# Patient Record
Sex: MALE | Race: WHITE | NOT HISPANIC OR LATINO | Employment: FULL TIME | URBAN - METROPOLITAN AREA
[De-identification: names, ages, dates, MRNs, and addresses within clinical notes are randomized per-mention and may not be internally consistent; named-entity substitution may affect disease eponyms.]

---

## 2017-08-30 ENCOUNTER — GENERIC CONVERSION - ENCOUNTER (OUTPATIENT)
Dept: OTHER | Facility: OTHER | Age: 62
End: 2017-08-30

## 2017-08-30 DIAGNOSIS — N40.1 ENLARGED PROSTATE WITH LOWER URINARY TRACT SYMPTOMS (LUTS): ICD-10-CM

## 2018-01-22 VITALS
WEIGHT: 242 LBS | HEIGHT: 68 IN | SYSTOLIC BLOOD PRESSURE: 142 MMHG | BODY MASS INDEX: 36.68 KG/M2 | DIASTOLIC BLOOD PRESSURE: 84 MMHG

## 2018-08-28 RX ORDER — CHLORAL HYDRATE 500 MG
1 CAPSULE ORAL DAILY
COMMUNITY

## 2018-08-28 RX ORDER — LISINOPRIL 10 MG/1
20 TABLET ORAL
COMMUNITY
End: 2020-07-15 | Stop reason: ALTCHOICE

## 2018-08-28 RX ORDER — ERGOCALCIFEROL (VITAMIN D2) 10 MCG
1 TABLET ORAL DAILY
COMMUNITY
End: 2020-07-28

## 2018-08-28 RX ORDER — HYDROCORTISONE ACETATE 25 MG/1
25 SUPPOSITORY RECTAL 2 TIMES DAILY
Status: ON HOLD | COMMUNITY
Start: 2013-12-10 | End: 2020-08-04 | Stop reason: ALTCHOICE

## 2018-08-28 RX ORDER — CLOTRIMAZOLE 1 %
CREAM (GRAM) TOPICAL
COMMUNITY
Start: 2017-07-11 | End: 2020-07-15 | Stop reason: ALTCHOICE

## 2018-08-30 ENCOUNTER — OFFICE VISIT (OUTPATIENT)
Dept: UROLOGY | Facility: MEDICAL CENTER | Age: 63
End: 2018-08-30
Payer: COMMERCIAL

## 2018-08-30 VITALS
HEIGHT: 68 IN | SYSTOLIC BLOOD PRESSURE: 136 MMHG | DIASTOLIC BLOOD PRESSURE: 88 MMHG | BODY MASS INDEX: 34.86 KG/M2 | WEIGHT: 230 LBS

## 2018-08-30 DIAGNOSIS — N40.1 BPH WITH OBSTRUCTION/LOWER URINARY TRACT SYMPTOMS: Primary | ICD-10-CM

## 2018-08-30 DIAGNOSIS — N43.40 SPERMATOCELE: ICD-10-CM

## 2018-08-30 DIAGNOSIS — N13.8 BPH WITH OBSTRUCTION/LOWER URINARY TRACT SYMPTOMS: Primary | ICD-10-CM

## 2018-08-30 LAB
SL AMB  POCT GLUCOSE, UA: NORMAL
SL AMB LEUKOCYTE ESTERASE,UA: NORMAL
SL AMB POCT BILIRUBIN,UA: NORMAL
SL AMB POCT BLOOD,UA: NORMAL
SL AMB POCT CLARITY,UA: CLEAR
SL AMB POCT COLOR,UA: YELLOW
SL AMB POCT KETONES,UA: NORMAL
SL AMB POCT NITRITE,UA: NORMAL
SL AMB POCT PH,UA: 6
SL AMB POCT SPECIFIC GRAVITY,UA: 1.01
SL AMB POCT URINE PROTEIN: NORMAL
SL AMB POCT UROBILINOGEN: 0.2

## 2018-08-30 PROCEDURE — 81003 URINALYSIS AUTO W/O SCOPE: CPT | Performed by: UROLOGY

## 2018-08-30 PROCEDURE — 99214 OFFICE O/P EST MOD 30 MIN: CPT | Performed by: UROLOGY

## 2018-08-30 NOTE — PROGRESS NOTES
Assessment/Plan:  1  BPH with slight increase of symptoms  Options discussed, it is certainly is call whether he is symptomatic enough to take a medicine such as Flomax  2   Observed spermatocele    3  Follow-up one year with PSA  No problem-specific Assessment & Plan notes found for this encounter  Diagnoses and all orders for this visit:    BPH with obstruction/lower urinary tract symptoms  -     POCT urine dip auto non-scope  -     PSA Total, Diagnostic; Future    Spermatocele    Other orders  -     aspirin 81 MG tablet; Take by mouth  -     clotrimazole (LOTRIMIN) 1 % cream; Apply bid as directed  -     Multiple Vitamin (DAILY VALUE MULTIVITAMIN) TABS; Take by mouth  -     lisinopril (ZESTRIL) 10 mg tablet; Take by mouth  -     Omega-3 1000 MG CAPS; Take by mouth  -     hydrocortisone (ANUSOL-HC) 25 mg suppository; Insert 25 mg into the rectum 2 (two) times a day          Subjective:      Patient ID: Tapan Mayorga is a 60 y o  male  1   Mild BPH,  flow is a bit slower than last year, but overall tolerates it okay  Rare nocturia, no hematuria infections stones incontinence  2   Right spermatocele is occasionally uncomfortable when sitting prolonged times, but does not really feel at riding his motorcycle  The following portions of the patient's history were reviewed and updated as appropriate: allergies, current medications, past family history, past medical history, past social history, past surgical history and problem list     Review of Systems   All other systems reviewed and are negative  Objective:      /88   Ht 5' 8" (1 727 m)   Wt 104 kg (230 lb)   BMI 34 97 kg/m²          Physical Exam   Constitutional: He is oriented to person, place, and time  He appears well-developed and well-nourished  No distress  HENT:   Head: Normocephalic and atraumatic  Eyes: Conjunctivae are normal    Cardiovascular: Normal rate and regular rhythm      Pulmonary/Chest: Effort normal and breath sounds normal  No respiratory distress  He has no wheezes  Abdominal: Soft  Bowel sounds are normal  He exhibits no distension and no mass  There is no tenderness  Genitourinary:   Genitourinary Comments: 4 cm right spermatocele, nontender  Testes normal  Prostate minimally enlarged no nodules   Neurological: He is alert and oriented to person, place, and time  Skin: Skin is warm and dry  He is not diaphoretic

## 2018-08-30 NOTE — LETTER
August 30, 2018     Moy De Jesus MD  [address]    Patient: Swathi Hein   YOB: 1955   Date of Visit: 8/30/2018       Dear Dr Bryanna Stanley: Thank you for referring Gemini Myles to me for evaluation  Below are my notes for this consultation  If you have questions, please do not hesitate to call me  I look forward to following your patient along with you  Sincerely,        Josh Arroyo MD        CC: No Recipients  Josh Arroyo MD  8/30/2018  4:37 PM  Sign at close encounter  Assessment/Plan:  1  BPH with slight increase of symptoms  Options discussed, it is certainly is call whether he is symptomatic enough to take a medicine such as Flomax  2   Observed spermatocele    3  Follow-up one year with PSA  No problem-specific Assessment & Plan notes found for this encounter  Diagnoses and all orders for this visit:    BPH with obstruction/lower urinary tract symptoms  -     POCT urine dip auto non-scope  -     PSA Total, Diagnostic; Future    Spermatocele    Other orders  -     aspirin 81 MG tablet; Take by mouth  -     clotrimazole (LOTRIMIN) 1 % cream; Apply bid as directed  -     Multiple Vitamin (DAILY VALUE MULTIVITAMIN) TABS; Take by mouth  -     lisinopril (ZESTRIL) 10 mg tablet; Take by mouth  -     Omega-3 1000 MG CAPS; Take by mouth  -     hydrocortisone (ANUSOL-HC) 25 mg suppository; Insert 25 mg into the rectum 2 (two) times a day          Subjective:      Patient ID: Swatih Hein is a 60 y o  male  1   Mild BPH,  flow is a bit slower than last year, but overall tolerates it okay  Rare nocturia, no hematuria infections stones incontinence  2   Right spermatocele is occasionally uncomfortable when sitting prolonged times, but does not really feel at riding his motorcycle          The following portions of the patient's history were reviewed and updated as appropriate: allergies, current medications, past family history, past medical history, past social history, past surgical history and problem list     Review of Systems   All other systems reviewed and are negative  Objective:      /88   Ht 5' 8" (1 727 m)   Wt 104 kg (230 lb)   BMI 34 97 kg/m²           Physical Exam   Constitutional: He is oriented to person, place, and time  He appears well-developed and well-nourished  No distress  HENT:   Head: Normocephalic and atraumatic  Eyes: Conjunctivae are normal    Cardiovascular: Normal rate and regular rhythm  Pulmonary/Chest: Effort normal and breath sounds normal  No respiratory distress  He has no wheezes  Abdominal: Soft  Bowel sounds are normal  He exhibits no distension and no mass  There is no tenderness  Genitourinary:   Genitourinary Comments: 4 cm right spermatocele, nontender  Testes normal  Prostate minimally enlarged no nodules   Neurological: He is alert and oriented to person, place, and time  Skin: Skin is warm and dry  He is not diaphoretic

## 2019-09-09 ENCOUNTER — OFFICE VISIT (OUTPATIENT)
Dept: UROLOGY | Facility: MEDICAL CENTER | Age: 64
End: 2019-09-09
Payer: COMMERCIAL

## 2019-09-09 VITALS
HEART RATE: 69 BPM | BODY MASS INDEX: 38.49 KG/M2 | WEIGHT: 254 LBS | HEIGHT: 68 IN | DIASTOLIC BLOOD PRESSURE: 82 MMHG | SYSTOLIC BLOOD PRESSURE: 130 MMHG

## 2019-09-09 DIAGNOSIS — N40.1 BPH WITH URINARY OBSTRUCTION: Primary | ICD-10-CM

## 2019-09-09 DIAGNOSIS — N13.8 BPH WITH URINARY OBSTRUCTION: Primary | ICD-10-CM

## 2019-09-09 DIAGNOSIS — N43.40 SPERMATOCELE: ICD-10-CM

## 2019-09-09 PROCEDURE — 99214 OFFICE O/P EST MOD 30 MIN: CPT | Performed by: UROLOGY

## 2019-09-09 NOTE — PROGRESS NOTES
HISTORY:    1  Follow-up BPH, no more bothered than last year, mild symptoms of urgency slower flow, but empties okay no hematuria infections stones  2  Right spermatocele measured 4 cm on sono three years ago, no discomfort    3  Recent PSA low at 0 3       ASSESSMENT / PLAN:    Symptom wise, doing well without any need for BPH meds  He will monitor any change in symptoms and let me know if needs to consider starting meds    Similarly, no need for treatment of the spermatocele unless were to bother him    Follow-up here if any new issues come up, yearly PSA at family    The following portions of the patient's history were reviewed and updated as appropriate: allergies, current medications, past family history, past medical history, past social history, past surgical history and problem list     Review of Systems   All other systems reviewed and are negative  Objective:     Physical Exam   Constitutional: He is oriented to person, place, and time  He appears well-developed and well-nourished  No distress  HENT:   Head: Normocephalic and atraumatic  Eyes: Conjunctivae are normal    Cardiovascular: Normal rate and regular rhythm  Pulmonary/Chest: Effort normal and breath sounds normal  No respiratory distress  He has no wheezes  Abdominal: Soft  Bowel sounds are normal  He exhibits no distension and no mass  There is no tenderness  Genitourinary:   Genitourinary Comments: Penis testes normal except for soft, nontender spermatocele over right testicle  Prostate minimally enlarged no nodules   Neurological: He is alert and oriented to person, place, and time  Skin: Skin is warm and dry  He is not diaphoretic           No results found for: PSA]  No results found for: BUN  No results found for: CREATININE  No components found for: CBC      Patient Active Problem List   Diagnosis    BPH with urinary obstruction    Spermatocele        Diagnoses and all orders for this visit:    BPH with urinary obstruction    Spermatocele           Patient ID: Tanisha Beatty is a 58 y o  male        Current Outpatient Medications:     aspirin 81 MG tablet, Take by mouth, Disp: , Rfl:     clotrimazole (LOTRIMIN) 1 % cream, Apply bid as directed, Disp: , Rfl:     lisinopril (ZESTRIL) 10 mg tablet, Take by mouth, Disp: , Rfl:     Multiple Vitamin (DAILY VALUE MULTIVITAMIN) TABS, Take by mouth, Disp: , Rfl:     Omega-3 1000 MG CAPS, Take by mouth, Disp: , Rfl:     hydrocortisone (ANUSOL-HC) 25 mg suppository, Insert 25 mg into the rectum 2 (two) times a day, Disp: , Rfl:     Past Medical History:   Diagnosis Date    Benign prostatic hyperplasia with lower urinary tract symptoms     Epididymitis     Hyperlipidemia     Hypertension     Spermatocele        Past Surgical History:   Procedure Laterality Date    HERNIA REPAIR  1997    KNEE SURGERY Right 04/2016    For removal of bursa       Social History

## 2020-06-04 ENCOUNTER — TELEPHONE (OUTPATIENT)
Dept: UROLOGY | Facility: MEDICAL CENTER | Age: 65
End: 2020-06-04

## 2020-06-04 ENCOUNTER — OFFICE VISIT (OUTPATIENT)
Dept: UROLOGY | Facility: MEDICAL CENTER | Age: 65
End: 2020-06-04
Payer: COMMERCIAL

## 2020-06-04 VITALS
HEIGHT: 68 IN | BODY MASS INDEX: 37.59 KG/M2 | SYSTOLIC BLOOD PRESSURE: 128 MMHG | DIASTOLIC BLOOD PRESSURE: 78 MMHG | WEIGHT: 248 LBS | TEMPERATURE: 98.1 F

## 2020-06-04 DIAGNOSIS — N45.1 EPIDIDYMITIS: ICD-10-CM

## 2020-06-04 DIAGNOSIS — N13.8 BPH WITH URINARY OBSTRUCTION: Primary | ICD-10-CM

## 2020-06-04 DIAGNOSIS — N40.1 BPH WITH URINARY OBSTRUCTION: Primary | ICD-10-CM

## 2020-06-04 DIAGNOSIS — N43.40 SPERMATOCELE: ICD-10-CM

## 2020-06-04 PROCEDURE — 99214 OFFICE O/P EST MOD 30 MIN: CPT | Performed by: UROLOGY

## 2020-06-04 RX ORDER — DOXYCYCLINE HYCLATE 100 MG/1
100 CAPSULE ORAL EVERY 12 HOURS SCHEDULED
Qty: 20 CAPSULE | Refills: 0 | Status: SHIPPED | OUTPATIENT
Start: 2020-06-04 | End: 2020-06-14

## 2020-06-04 RX ORDER — LORATADINE 10 MG/1
10 TABLET ORAL DAILY
COMMUNITY
End: 2020-10-15 | Stop reason: HOSPADM

## 2020-06-15 ENCOUNTER — HOSPITAL ENCOUNTER (OUTPATIENT)
Dept: ULTRASOUND IMAGING | Facility: MEDICAL CENTER | Age: 65
Discharge: HOME | End: 2020-06-15
Attending: UROLOGY
Payer: COMMERCIAL

## 2020-06-15 DIAGNOSIS — N43.40 SPERMATOCELE: ICD-10-CM

## 2020-06-15 DIAGNOSIS — N45.1 EPIDIDYMITIS: ICD-10-CM

## 2020-06-15 PROCEDURE — 76870 US EXAM SCROTUM: CPT

## 2020-06-17 ENCOUNTER — TELEPHONE (OUTPATIENT)
Dept: UROLOGY | Facility: MEDICAL CENTER | Age: 65
End: 2020-06-17

## 2020-07-15 ENCOUNTER — OFFICE VISIT (OUTPATIENT)
Dept: UROLOGY | Facility: MEDICAL CENTER | Age: 65
End: 2020-07-15
Payer: COMMERCIAL

## 2020-07-15 VITALS
BODY MASS INDEX: 37.59 KG/M2 | DIASTOLIC BLOOD PRESSURE: 80 MMHG | HEIGHT: 68 IN | TEMPERATURE: 98.9 F | SYSTOLIC BLOOD PRESSURE: 148 MMHG | WEIGHT: 248 LBS | HEART RATE: 81 BPM

## 2020-07-15 DIAGNOSIS — N43.40 SPERMATOCELE: Primary | ICD-10-CM

## 2020-07-15 PROCEDURE — 99214 OFFICE O/P EST MOD 30 MIN: CPT | Performed by: UROLOGY

## 2020-07-15 RX ORDER — LISINOPRIL 20 MG/1
20 TABLET ORAL DAILY
COMMUNITY
Start: 2020-06-09

## 2020-07-15 NOTE — PROGRESS NOTES
HISTORY:    Follow-up for right spermatocele, two actual components on ultrasound, max diameter about 5 cm  Last visit we given antibiotics in case there was some epididymitis, he does not think it helped at all  Uncomfortable with motion, crossing legs, exertion etc   Ultrasound shows normal testicles, small left hydrocele  ASSESSMENT / PLAN:      Options discussed  I been very careful to tell him the postop recovery can be difficult , that the surgery  causesswelling, edema, hematoma etc   Overall, he feels the symptoms are enough that he does want to schedule surgery  Potential complications discussed any consents  The following portions of the patient's history were reviewed and updated as appropriate: allergies, current medications, past family history, past medical history, past social history, past surgical history and problem list     Review of Systems   All other systems reviewed and are negative  Objective:     Physical Exam   Constitutional: He is oriented to person, place, and time  He appears well-developed and well-nourished  No distress  HENT:   Head: Normocephalic and atraumatic  Eyes: No scleral icterus  Pulmonary/Chest: Effort normal    Genitourinary:   Genitourinary Comments:  Penis normal   Testes normal and nontender  5 cm right spermatocele, moderately tender   Neurological: He is alert and oriented to person, place, and time  Skin: He is not diaphoretic  No pallor  Psychiatric: He has a normal mood and affect   His behavior is normal  Judgment and thought content normal         ultrasound  results reviewed and discussed  No results found for: PSA]  No results found for: BUN  No results found for: CREATININE  No components found for: CBC      Patient Active Problem List   Diagnosis    BPH with urinary obstruction    Spermatocele    Epididymitis        Diagnoses and all orders for this visit:    Spermatocele           Patient ID: Beth Cole bonita jacinto 71 y o  male        Current Outpatient Medications:     aspirin 81 MG tablet, Take by mouth, Disp: , Rfl:     lisinopril (ZESTRIL) 20 mg tablet, , Disp: , Rfl:     loratadine (CLARITIN) 10 mg tablet, Take 10 mg by mouth daily, Disp: , Rfl:     Multiple Vitamin (DAILY VALUE MULTIVITAMIN) TABS, Take by mouth, Disp: , Rfl:     Omega-3 1000 MG CAPS, Take by mouth, Disp: , Rfl:     hydrocortisone (ANUSOL-HC) 25 mg suppository, Insert 25 mg into the rectum 2 (two) times a day, Disp: , Rfl:     Past Medical History:   Diagnosis Date    Benign prostatic hyperplasia with lower urinary tract symptoms     Epididymitis     Hyperlipidemia     Hypertension     Spermatocele        Past Surgical History:   Procedure Laterality Date    HERNIA REPAIR  1997    KNEE SURGERY Right 04/2016    For removal of bursa       Social History

## 2020-07-16 ENCOUNTER — TELEPHONE (OUTPATIENT)
Dept: UROLOGY | Facility: MEDICAL CENTER | Age: 65
End: 2020-07-16

## 2020-07-16 NOTE — TELEPHONE ENCOUNTER
I spoke to the patient and confirmed 8/4/2020     -instructions given verbally and Emailed  -patient will stop his Aspirin 7 days prior  -CBC, CMP, COVID, Urine C&S, and EKG 7/25/2020 or 7/27/2020 at [address] [facility]  -HBS - no auth required

## 2020-07-16 NOTE — TELEPHONE ENCOUNTER
I left a voice mail message for the patient to schedule his Right Spermatocelectomy with Dr Roro Peacock   Suggested 8/4/2020 at [facility]

## 2020-07-25 ENCOUNTER — APPOINTMENT (OUTPATIENT)
Dept: LAB | Facility: MEDICAL CENTER | Age: 65
End: 2020-07-25
Attending: UROLOGY
Payer: COMMERCIAL

## 2020-07-25 ENCOUNTER — CLINICAL SUPPORT (OUTPATIENT)
Dept: URGENT CARE | Facility: MEDICAL CENTER | Age: 65
End: 2020-07-25
Attending: UROLOGY
Payer: COMMERCIAL

## 2020-07-25 DIAGNOSIS — N43.40 SPERMATOCELE: ICD-10-CM

## 2020-07-25 DIAGNOSIS — Z11.59 SCREENING FOR VIRAL DISEASE: ICD-10-CM

## 2020-07-25 LAB
ALBUMIN SERPL BCP-MCNC: 3.9 G/DL (ref 3.5–5)
ALP SERPL-CCNC: 68 U/L (ref 46–116)
ALT SERPL W P-5'-P-CCNC: 31 U/L (ref 12–78)
ANION GAP SERPL CALCULATED.3IONS-SCNC: 4 MMOL/L (ref 4–13)
AST SERPL W P-5'-P-CCNC: 21 U/L (ref 5–45)
ATRIAL RATE: 61 BPM
BASOPHILS # BLD AUTO: 0.02 THOUSANDS/ΜL (ref 0–0.1)
BASOPHILS NFR BLD AUTO: 0 % (ref 0–1)
BILIRUB SERPL-MCNC: 0.65 MG/DL (ref 0.2–1)
BUN SERPL-MCNC: 14 MG/DL (ref 5–25)
CALCIUM SERPL-MCNC: 9.3 MG/DL (ref 8.3–10.1)
CHLORIDE SERPL-SCNC: 105 MMOL/L (ref 100–108)
CO2 SERPL-SCNC: 29 MMOL/L (ref 21–32)
CREAT SERPL-MCNC: 1.02 MG/DL (ref 0.6–1.3)
EOSINOPHIL # BLD AUTO: 0.17 THOUSAND/ΜL (ref 0–0.61)
EOSINOPHIL NFR BLD AUTO: 3 % (ref 0–6)
ERYTHROCYTE [DISTWIDTH] IN BLOOD BY AUTOMATED COUNT: 13.8 % (ref 11.6–15.1)
GFR SERPL CREATININE-BSD FRML MDRD: 77 ML/MIN/1.73SQ M
GLUCOSE P FAST SERPL-MCNC: 92 MG/DL (ref 65–99)
HCT VFR BLD AUTO: 44.6 % (ref 36.5–49.3)
HGB BLD-MCNC: 14.5 G/DL (ref 12–17)
IMM GRANULOCYTES # BLD AUTO: 0.01 THOUSAND/UL (ref 0–0.2)
IMM GRANULOCYTES NFR BLD AUTO: 0 % (ref 0–2)
LYMPHOCYTES # BLD AUTO: 1.73 THOUSANDS/ΜL (ref 0.6–4.47)
LYMPHOCYTES NFR BLD AUTO: 26 % (ref 14–44)
MCH RBC QN AUTO: 30.9 PG (ref 26.8–34.3)
MCHC RBC AUTO-ENTMCNC: 32.5 G/DL (ref 31.4–37.4)
MCV RBC AUTO: 95 FL (ref 82–98)
MONOCYTES # BLD AUTO: 0.8 THOUSAND/ΜL (ref 0.17–1.22)
MONOCYTES NFR BLD AUTO: 12 % (ref 4–12)
NEUTROPHILS # BLD AUTO: 4.04 THOUSANDS/ΜL (ref 1.85–7.62)
NEUTS SEG NFR BLD AUTO: 59 % (ref 43–75)
NRBC BLD AUTO-RTO: 0 /100 WBCS
P AXIS: 56 DEGREES
PLATELET # BLD AUTO: 196 THOUSANDS/UL (ref 149–390)
PMV BLD AUTO: 10 FL (ref 8.9–12.7)
POTASSIUM SERPL-SCNC: 4.3 MMOL/L (ref 3.5–5.3)
PR INTERVAL: 186 MS
PROT SERPL-MCNC: 7.3 G/DL (ref 6.4–8.2)
QRS AXIS: 27 DEGREES
QRSD INTERVAL: 112 MS
QT INTERVAL: 404 MS
QTC INTERVAL: 406 MS
RBC # BLD AUTO: 4.7 MILLION/UL (ref 3.88–5.62)
SODIUM SERPL-SCNC: 138 MMOL/L (ref 136–145)
T WAVE AXIS: 19 DEGREES
VENTRICULAR RATE: 61 BPM
WBC # BLD AUTO: 6.77 THOUSAND/UL (ref 4.31–10.16)

## 2020-07-25 PROCEDURE — 93005 ELECTROCARDIOGRAM TRACING: CPT

## 2020-07-25 PROCEDURE — 85025 COMPLETE CBC W/AUTO DIFF WBC: CPT

## 2020-07-25 PROCEDURE — 93010 ELECTROCARDIOGRAM REPORT: CPT

## 2020-07-25 PROCEDURE — U0003 INFECTIOUS AGENT DETECTION BY NUCLEIC ACID (DNA OR RNA); SEVERE ACUTE RESPIRATORY SYNDROME CORONAVIRUS 2 (SARS-COV-2) (CORONAVIRUS DISEASE [COVID-19]), AMPLIFIED PROBE TECHNIQUE, MAKING USE OF HIGH THROUGHPUT TECHNOLOGIES AS DESCRIBED BY CMS-2020-01-R: HCPCS

## 2020-07-25 PROCEDURE — 80053 COMPREHEN METABOLIC PANEL: CPT

## 2020-07-25 PROCEDURE — 87086 URINE CULTURE/COLONY COUNT: CPT

## 2020-07-25 PROCEDURE — 36415 COLL VENOUS BLD VENIPUNCTURE: CPT

## 2020-07-26 LAB — BACTERIA UR CULT: NORMAL

## 2020-07-27 LAB — SARS-COV-2 RNA SPEC QL NAA+PROBE: NOT DETECTED

## 2020-07-28 NOTE — PRE-PROCEDURE INSTRUCTIONS
Pre-Surgery Instructions:   Medication Instructions    aspirin 81 MG tablet Patient was instructed by Physician and understands   hydrocortisone (ANUSOL-HC) 25 mg suppository Patient was instructed by Physician and understands   lisinopril (ZESTRIL) 20 mg tablet Patient was instructed by Physician and understands   loratadine (CLARITIN) 10 mg tablet Patient was instructed by Physician and understands   Multiple Vitamins-Minerals (PRESERVISION AREDS 2 PO) Patient was instructed by Physician and understands   Omega-3 1000 MG CAPS Patient was instructed by Physician and understands  [facility] preop instructions reviewed  Pt will get dial antibacterial soap

## 2020-07-31 PROCEDURE — NC001 PR NO CHARGE: Performed by: UROLOGY

## 2020-07-31 NOTE — H&P
HISTORY AND PHYSICAL  ? ? Patient Name: Lilli Soto  Patient MRN: [id number]  Attending Provider: Viky Tracy MD  Service: Urology  Chief Complaint    Right spermatocele    HPI   Lilli Soto is a 71 y o  male with  right spermatocele, two actual components on ultrasound, max diameter about 5 cm  Last visit we given antibiotics in case there was some epididymitis, he does not think it helped at all        Uncomfortable with motion, crossing legs, exertion etc   Ultrasound shows normal testicles, small left hydrocele  I plan right spermatocelectomy  Potential risks and complications discussed, and informed consent was given by the patient  Medications  Meds/Allergies     No current facility-administered medications for this encounter  Cannot display prior to admission medications because the patient has not been admitted in this contact  No current facility-administered medications for this encounter       Current Outpatient Medications:     aspirin 81 MG tablet, Take 81 mg by mouth daily , Disp: , Rfl:     hydrocortisone (ANUSOL-HC) 25 mg suppository, Insert 25 mg into the rectum 2 (two) times a day, Disp: , Rfl:     lisinopril (ZESTRIL) 20 mg tablet, Take 20 mg by mouth daily , Disp: , Rfl:     loratadine (CLARITIN) 10 mg tablet, Take 10 mg by mouth daily, Disp: , Rfl:     Multiple Vitamins-Minerals (PRESERVISION AREDS 2 PO), Take 1 tablet by mouth daily, Disp: , Rfl:     Omega-3 1000 MG CAPS, Take 1 capsule by mouth daily , Disp: , Rfl:   Review of Systems  10 point review of systems negative except as noted in HPI  Allergies  No Known Allergies  PMH  Past Medical History:   Diagnosis Date    Arthritis     Benign prostatic hyperplasia with lower urinary tract symptoms     Epididymitis     Hearing loss     Hyperlipidemia     Hypertension     Macular degeneration     Sleep apnea     doesn't use the CPAP    Spermatocele     Wears glasses      Past surgical history  Past Surgical History:   Procedure Laterality Date    COLONOSCOPY      HERNIA REPAIR  1997    KNEE ARTHROSCOPY Bilateral     knee    KNEE SURGERY Right 04/2016    For removal of bursa    WISDOM TOOTH EXTRACTION       Social history  Social History     Tobacco Use    Smoking status: Never Smoker    Smokeless tobacco: Never Used   Substance Use Topics    Alcohol use: Yes     Frequency: 4 or more times a week     Drinks per session: 1 or 2    Drug use: No     ?  Physical Exam  5 cm right spermatocele    vs  General appearance: alert and oriented, in no acute distress  Head: Normocephalic, without obvious abnormality, atraumatic  Eyes: conjunctivae/corneas clear  PERRL, EOM's intact  Fundi benign    Throat: lips, mucosa, and tongue normal; teeth and gums normal  Neck: no adenopathy, no carotid bruit, no JVD, supple, symmetrical, trachea midline and thyroid not enlarged, symmetric, no tenderness/mass/nodules  Lungs: clear to auscultation bilaterally  Heart: regular rate and rhythm, S1, S2 normal, no murmur, click, rub or gallop  Abdomen: soft, non-tender; bowel sounds normal; no masses,  no organomegaly  Male genitalia: normal  Extremities: extremities normal, warm and well-perfused; no cyanosis, clubbing, or edema  Neurologic: Grossly normal  Viky Tracy MD

## 2020-08-03 ENCOUNTER — ANESTHESIA EVENT (OUTPATIENT)
Dept: PERIOP | Facility: HOSPITAL | Age: 65
End: 2020-08-03
Payer: COMMERCIAL

## 2020-08-04 ENCOUNTER — HOSPITAL ENCOUNTER (OUTPATIENT)
Facility: HOSPITAL | Age: 65
Setting detail: OUTPATIENT SURGERY
Discharge: HOME | End: 2020-08-04
Attending: UROLOGY | Admitting: UROLOGY
Payer: COMMERCIAL

## 2020-08-04 ENCOUNTER — ANESTHESIA (OUTPATIENT)
Dept: PERIOP | Facility: HOSPITAL | Age: 65
End: 2020-08-04
Payer: COMMERCIAL

## 2020-08-04 VITALS
BODY MASS INDEX: 37.59 KG/M2 | HEART RATE: 65 BPM | TEMPERATURE: 97.9 F | WEIGHT: 248 LBS | HEIGHT: 68 IN | RESPIRATION RATE: 20 BRPM | DIASTOLIC BLOOD PRESSURE: 73 MMHG | OXYGEN SATURATION: 96 % | SYSTOLIC BLOOD PRESSURE: 149 MMHG

## 2020-08-04 DIAGNOSIS — N43.40 SPERMATOCELE: ICD-10-CM

## 2020-08-04 PROBLEM — E66.9 OBESE: Status: ACTIVE | Noted: 2020-08-04

## 2020-08-04 PROCEDURE — 88304 TISSUE EXAM BY PATHOLOGIST: CPT | Performed by: PATHOLOGY

## 2020-08-04 PROCEDURE — 54840 REMOVE EPIDIDYMIS LESION: CPT | Performed by: UROLOGY

## 2020-08-04 PROCEDURE — NC001 PR NO CHARGE: Performed by: UROLOGY

## 2020-08-04 RX ORDER — OXYCODONE HYDROCHLORIDE AND ACETAMINOPHEN 5; 325 MG/1; MG/1
2 TABLET ORAL EVERY 4 HOURS PRN
Status: DISCONTINUED | OUTPATIENT
Start: 2020-08-04 | End: 2020-08-04 | Stop reason: HOSPADM

## 2020-08-04 RX ORDER — ONDANSETRON 2 MG/ML
4 INJECTION INTRAMUSCULAR; INTRAVENOUS ONCE AS NEEDED
Status: DISCONTINUED | OUTPATIENT
Start: 2020-08-04 | End: 2020-08-04 | Stop reason: HOSPADM

## 2020-08-04 RX ORDER — CEFAZOLIN SODIUM 2 G/50ML
SOLUTION INTRAVENOUS AS NEEDED
Status: DISCONTINUED | OUTPATIENT
Start: 2020-08-04 | End: 2020-08-04

## 2020-08-04 RX ORDER — MIDAZOLAM HYDROCHLORIDE 2 MG/2ML
INJECTION, SOLUTION INTRAMUSCULAR; INTRAVENOUS AS NEEDED
Status: DISCONTINUED | OUTPATIENT
Start: 2020-08-04 | End: 2020-08-04

## 2020-08-04 RX ORDER — FENTANYL CITRATE 50 UG/ML
INJECTION, SOLUTION INTRAMUSCULAR; INTRAVENOUS AS NEEDED
Status: DISCONTINUED | OUTPATIENT
Start: 2020-08-04 | End: 2020-08-04

## 2020-08-04 RX ORDER — BUPIVACAINE HYDROCHLORIDE 5 MG/ML
INJECTION, SOLUTION PERINEURAL AS NEEDED
Status: DISCONTINUED | OUTPATIENT
Start: 2020-08-04 | End: 2020-08-04 | Stop reason: HOSPADM

## 2020-08-04 RX ORDER — CEFAZOLIN SODIUM 2 G/50ML
2000 SOLUTION INTRAVENOUS ONCE
Status: DISCONTINUED | OUTPATIENT
Start: 2020-08-04 | End: 2020-08-04 | Stop reason: HOSPADM

## 2020-08-04 RX ORDER — HYDROCODONE BITARTRATE AND ACETAMINOPHEN 5; 325 MG/1; MG/1
1-2 TABLET ORAL EVERY 6 HOURS PRN
Qty: 20 TABLET | Refills: 0 | Status: SHIPPED | OUTPATIENT
Start: 2020-08-04 | End: 2020-08-14

## 2020-08-04 RX ORDER — PROPOFOL 10 MG/ML
INJECTION, EMULSION INTRAVENOUS AS NEEDED
Status: DISCONTINUED | OUTPATIENT
Start: 2020-08-04 | End: 2020-08-04

## 2020-08-04 RX ORDER — ONDANSETRON 2 MG/ML
INJECTION INTRAMUSCULAR; INTRAVENOUS AS NEEDED
Status: DISCONTINUED | OUTPATIENT
Start: 2020-08-04 | End: 2020-08-04

## 2020-08-04 RX ORDER — MAGNESIUM HYDROXIDE 1200 MG/15ML
LIQUID ORAL AS NEEDED
Status: DISCONTINUED | OUTPATIENT
Start: 2020-08-04 | End: 2020-08-04 | Stop reason: HOSPADM

## 2020-08-04 RX ORDER — OXYCODONE HYDROCHLORIDE AND ACETAMINOPHEN 5; 325 MG/1; MG/1
1 TABLET ORAL EVERY 4 HOURS PRN
Status: DISCONTINUED | OUTPATIENT
Start: 2020-08-04 | End: 2020-08-04 | Stop reason: HOSPADM

## 2020-08-04 RX ORDER — SODIUM CHLORIDE 9 MG/ML
125 INJECTION, SOLUTION INTRAVENOUS CONTINUOUS
Status: DISCONTINUED | OUTPATIENT
Start: 2020-08-04 | End: 2020-08-04 | Stop reason: HOSPADM

## 2020-08-04 RX ORDER — DEXAMETHASONE SODIUM PHOSPHATE 4 MG/ML
INJECTION, SOLUTION INTRA-ARTICULAR; INTRALESIONAL; INTRAMUSCULAR; INTRAVENOUS; SOFT TISSUE AS NEEDED
Status: DISCONTINUED | OUTPATIENT
Start: 2020-08-04 | End: 2020-08-04

## 2020-08-04 RX ORDER — FENTANYL CITRATE/PF 50 MCG/ML
25 SYRINGE (ML) INJECTION
Status: DISCONTINUED | OUTPATIENT
Start: 2020-08-04 | End: 2020-08-04 | Stop reason: HOSPADM

## 2020-08-04 RX ORDER — LIDOCAINE HYDROCHLORIDE 20 MG/ML
INJECTION, SOLUTION INFILTRATION; PERINEURAL AS NEEDED
Status: DISCONTINUED | OUTPATIENT
Start: 2020-08-04 | End: 2020-08-04

## 2020-08-04 RX ADMIN — FENTANYL CITRATE 25 MCG: 50 INJECTION, SOLUTION INTRAMUSCULAR; INTRAVENOUS at 07:51

## 2020-08-04 RX ADMIN — OXYCODONE HYDROCHLORIDE AND ACETAMINOPHEN 1 TABLET: 5; 325 TABLET ORAL at 09:51

## 2020-08-04 RX ADMIN — MIDAZOLAM 2 MG: 1 INJECTION INTRAMUSCULAR; INTRAVENOUS at 07:28

## 2020-08-04 RX ADMIN — LIDOCAINE HYDROCHLORIDE 5 ML: 20 INJECTION, SOLUTION INFILTRATION; PERINEURAL at 07:32

## 2020-08-04 RX ADMIN — DEXAMETHASONE SODIUM PHOSPHATE 4 MG: 4 INJECTION, SOLUTION INTRAMUSCULAR; INTRAVENOUS at 07:36

## 2020-08-04 RX ADMIN — ONDANSETRON 4 MG: 2 INJECTION INTRAMUSCULAR; INTRAVENOUS at 07:36

## 2020-08-04 RX ADMIN — PROPOFOL 150 MG: 10 INJECTION, EMULSION INTRAVENOUS at 07:32

## 2020-08-04 RX ADMIN — CEFAZOLIN SODIUM 2000 MG: 2 SOLUTION INTRAVENOUS at 07:15

## 2020-08-04 RX ADMIN — FENTANYL CITRATE 25 MCG: 50 INJECTION, SOLUTION INTRAMUSCULAR; INTRAVENOUS at 07:49

## 2020-08-04 RX ADMIN — SODIUM CHLORIDE 125 ML/HR: 0.9 INJECTION, SOLUTION INTRAVENOUS at 06:36

## 2020-08-04 NOTE — INTERVAL H&P NOTE
H&P reviewed  After examining the patient I find no changes in the patients condition since the H&P had been written      Vitals:    08/04/20 0616   BP: 154/82   Pulse: 67   Resp: 20   Temp: 98 9 °F (37 2 °C)   SpO2: 97%

## 2020-08-04 NOTE — ANESTHESIA PREPROCEDURE EVALUATION
Procedure:  SPERMATOCELECTOMY (Right Scrotum)    Relevant Problems   CARDIO   (+) Hyperlipidemia   (+) Hypertension      /RENAL   (+) BPH with urinary obstruction      MUSCULOSKELETAL   (+) Arthritis      PULMONARY   (+) Sleep apnea      Other   (+) Macular degeneration   (+) Obese   (+) Spermatocele        Physical Exam    Airway    Mallampati score: III  TM Distance: >3 FB  Neck ROM: full     Dental   No notable dental hx     Cardiovascular  Rhythm: regular, Rate: normal, Cardiovascular exam normal    Pulmonary  Pulmonary exam normal Breath sounds clear to auscultation,     Other Findings        Anesthesia Plan  ASA Score- 3     Anesthesia Type- general with ASA Monitors  Additional Monitors:   Airway Plan:           Plan Factors-    Chart reviewed  Existing labs reviewed  Induction- intravenous  Postoperative Plan- Plan for postoperative opioid use  Planned trial extubation    Informed Consent- Anesthetic plan and risks discussed with patient

## 2020-08-04 NOTE — ANESTHESIA POSTPROCEDURE EVALUATION
Post-Op Assessment Note    CV Status:  Stable    Pain management: adequate     Mental Status:  Alert   PONV Controlled:  None   Airway Patency:  Patent   Two or more mitigation strategies used for obstructive sleep apnea   Post Op Vitals Reviewed: Yes      Staff: Anesthesiologist       Blood pressure 149/73, pulse 65, temperature 97 9 °F (36 6 °C), temperature source Oral, resp  rate 20, height 5' 8", weight 112 kg (248 lb), SpO2 96 %  No complications documented      BP      Temp      Pulse     Resp      SpO2

## 2020-08-04 NOTE — OP NOTE
OPERATIVE REPORT  PATIENT NAME: Yaneth Quinonez    :  1955  MRN: [id number]  Pt Location: [facility] OR ROOM 08    SURGERY DATE: 2020    Surgeon(s) and Role:     * Arianna Hunt MD - Primary    Preop Diagnosis:  Spermatocele [N43 40]    Post-Op Diagnosis Codes: * Spermatocele [N43 40]    Procedure(s) (LRB):  SPERMATOCELECTOMY (Right)    Specimen(s):  ID Type Source Tests Collected by Time Destination   1 : RIGHT SPERMATOCELE  Tissue Spermatocele TISSUE EXAM Arianna Hunt MD 2020 [id number]        Estimated Blood Loss:   5 mL    Drains:  Open Drain Right Other (Comment) (Active)   Number of days: 0       Anesthesia Type:   General    Operative Indications:  Spermatocele [N43 40]kia    Operative Findings:  5 cm right spermatocele    Complications:   None    Procedure and Technique:  After the patient was placed under adequate general anesthesia, he was prepped and draped using chlorhexidine solution in supine position  Spermatic cord block was performed using 0 5% Marcaine  Right scrotal incision was made, taken through the dartos muscle  The plane between the tunica vaginalis and dartos was develop, and the testicle was delivered into the operative field  The tunica vaginalis was opened, exposing this testicle and spermatocele  Testicles normal    Using sharp and blunt dissection, the spermatocele was dissected off of the testicle and the epididymis in its entirety  No damage occurred to the testicle or the blood supply  After thorough control of bleeding using cautery, and one stitch of three 0 chromic on the testicle capsule, there was no significant bleeding, and the testicle placed back into its normal position  A drain was placed through a separate stab incision    The wound was then closed in layers using three 0 chromic on the dartos and three 0 chromic on the skin    The drain was sutured to the dressing for removal by the patient when he takes off the bandage tomorrow morning    Patient taken to recovery good condition   I was present for the entire procedure    Patient Disposition:  PACU     SIGNATURE: Rachel Lin MD  DATE: August 4, 2020  TIME: 10:07 AM

## 2020-08-04 NOTE — DISCHARGE SUMMARY
Discharge Summary - Baldo Nevarez 71 y o  male MRN: [id number]    Admission Date: 8/4/2020     Admitting Diagnosis: Spermatocele [N43 40]    Procedures Performed:  Right spermatocelectomy    Patient underwent planned outpt surgery and recovered without complication  Discharged in good condition  Medications were prescribed  Pt knows to have office follow-up at the appropriate time

## 2020-08-04 NOTE — DISCHARGE INSTRUCTIONS
ALL YOUR  PREVIOUS MEDS ARE THE SAME  EXCEPT, RESTART THE ASPIRIN ON FRIDAY  NEW MEDS:  Hydrocodone if needed for pain    No heavy lifting for 10 days    ****  REMOVE SUPPORTER AND BANDAGE TOMORROW MORNING  A TAN RUBBER BAND DRAIN WILL COME OUT OF THE SKIN WITH THE BANDAGE  SMALL AMOUNT OF SPOTTING FROM THAT OPENING IN THE SKIN IS NORMAL AND WILL SEAL UP IN A FEW DAYS  EXPECT SOME BLOOD IN URINE, BURNING, FREQUENT URINATION

## 2020-08-05 NOTE — TELEPHONE ENCOUNTER
Patient called in to inquire post surgery appointment  Patient stated he would like date for his disability paper work   Patient can be reached at [phone]

## 2020-08-05 NOTE — TELEPHONE ENCOUNTER
Call placed to patient and spoke with him Offered appointment for follow up s/p surgery  Appt given for 8/20/2020 at 2pm with Dr Itzel Ibanez  Pt is also inquiring about disability paperwork  Informed him that I will contact the surgery scheduler in regards to this paperwork

## 2020-08-05 NOTE — TELEPHONE ENCOUNTER
I left a voice mail message for the patient that no disability paper work has been received  I supplied the patient with my direct fax # [phone] to have the paper work faxed

## 2020-08-13 ENCOUNTER — TELEPHONE (OUTPATIENT)
Dept: UROLOGY | Facility: MEDICAL CENTER | Age: 65
End: 2020-08-13

## 2020-08-13 NOTE — TELEPHONE ENCOUNTER
Spoke to pt and advised per Dr Matthew Clark to keep incision site clean with diluted soap and water and to pat dry and cover w/clean dressing  It will heal secondarily from the inside out and does not need to be closed  Pt agreed to instructions

## 2020-08-13 NOTE — TELEPHONE ENCOUNTER
Patient should keep incision clean with dilute soap and water and pat dry and then cover with clean dressings and allowed to heal secondarily    Once an incision opens like this you do not close it again as oral we bacterial contamination the depth of the incision and this should heal from inside out

## 2020-08-13 NOTE — TELEPHONE ENCOUNTER
Patient of Dr Marlen Gaona seen in the [facility] office  Patient calling in regard to post op questions  Spot noted at incision site with yellow area on skin  He did not attempt to squeeze   Sutures have dissolved ? Too soon  No additional pain noted    Please call at [phone]  Thank you

## 2020-08-13 NOTE — TELEPHONE ENCOUNTER
Spoke to pt re post op questions s/p spermatocelectomy on 8/4 with Dr Brigitte English  Pt concerned incision site has "small gap" after stitches have dissolved  Wants to know if they dissolved too soon  No redness oozing or signs of infection present  The yellow spot question in question has disappeared  Pt has F/U appt on 8/20 with Dr Brigitte English

## 2020-08-20 ENCOUNTER — OFFICE VISIT (OUTPATIENT)
Dept: UROLOGY | Facility: MEDICAL CENTER | Age: 65
End: 2020-08-20

## 2020-08-20 VITALS
SYSTOLIC BLOOD PRESSURE: 138 MMHG | DIASTOLIC BLOOD PRESSURE: 82 MMHG | WEIGHT: 248 LBS | TEMPERATURE: 98.6 F | HEIGHT: 68 IN | BODY MASS INDEX: 37.59 KG/M2

## 2020-08-20 DIAGNOSIS — N43.40 SPERMATOCELE: Primary | ICD-10-CM

## 2020-08-20 PROCEDURE — 99024 POSTOP FOLLOW-UP VISIT: CPT | Performed by: UROLOGY

## 2020-08-31 ENCOUNTER — OFFICE VISIT (OUTPATIENT)
Dept: UROLOGY | Facility: MEDICAL CENTER | Age: 65
End: 2020-08-31

## 2020-08-31 VITALS
BODY MASS INDEX: 37.71 KG/M2 | TEMPERATURE: 98.6 F | HEIGHT: 68 IN | SYSTOLIC BLOOD PRESSURE: 118 MMHG | DIASTOLIC BLOOD PRESSURE: 80 MMHG | HEART RATE: 66 BPM

## 2020-08-31 DIAGNOSIS — N43.40 SPERMATOCELE: Primary | ICD-10-CM

## 2020-08-31 PROCEDURE — 99024 POSTOP FOLLOW-UP VISIT: CPT | Performed by: UROLOGY

## 2020-08-31 NOTE — PROGRESS NOTES
Now four weeks out from right spermatocelectomy, see prior notes regarding slight separation of skin    Since that time, only two episodes of spotting on underwear  No inflammation    Exam shows  smaller area of skin separation, about 1 cm length  Hematoma visible at the base  Good progress, recheck in one month

## 2020-09-11 NOTE — PROGRESS NOTES
Slow progress but healing after spermatocele like to me rib  Small area of opening is skin is closing slowly  Hematoma visible underneath, no current drainage    No infection    Follow-up six weeks

## 2020-10-15 ENCOUNTER — OFFICE VISIT (OUTPATIENT)
Dept: UROLOGY | Facility: MEDICAL CENTER | Age: 65
End: 2020-10-15

## 2020-10-15 VITALS
BODY MASS INDEX: 36.98 KG/M2 | DIASTOLIC BLOOD PRESSURE: 84 MMHG | HEIGHT: 68 IN | TEMPERATURE: 97.5 F | SYSTOLIC BLOOD PRESSURE: 132 MMHG | WEIGHT: 244 LBS

## 2020-10-15 DIAGNOSIS — N43.3 HYDROCELE IN ADULT: Primary | ICD-10-CM

## 2020-10-15 PROCEDURE — 99024 POSTOP FOLLOW-UP VISIT: CPT | Performed by: UROLOGY

## 2020-10-15 RX ORDER — AMOXICILLIN 500 MG/1
CAPSULE ORAL
COMMUNITY
Start: 2020-10-12

## 2021-03-30 DIAGNOSIS — Z23 ENCOUNTER FOR IMMUNIZATION: ICD-10-CM

## 2024-05-07 ENCOUNTER — OFFICE VISIT (OUTPATIENT)
Dept: UROLOGY | Facility: MEDICAL CENTER | Age: 69
End: 2024-05-07
Payer: MEDICARE

## 2024-05-07 VITALS
BODY MASS INDEX: 37.07 KG/M2 | OXYGEN SATURATION: 96 % | DIASTOLIC BLOOD PRESSURE: 80 MMHG | HEIGHT: 68 IN | SYSTOLIC BLOOD PRESSURE: 158 MMHG | HEART RATE: 66 BPM | WEIGHT: 244.6 LBS

## 2024-05-07 DIAGNOSIS — N13.8 BPH WITH URINARY OBSTRUCTION: ICD-10-CM

## 2024-05-07 DIAGNOSIS — N20.0 KIDNEY STONE: Primary | ICD-10-CM

## 2024-05-07 DIAGNOSIS — N50.82 SCROTAL PAIN: ICD-10-CM

## 2024-05-07 DIAGNOSIS — N43.0 ENCYSTED HYDROCELE: ICD-10-CM

## 2024-05-07 DIAGNOSIS — N40.1 BPH WITH URINARY OBSTRUCTION: ICD-10-CM

## 2024-05-07 PROBLEM — N45.1 EPIDIDYMITIS: Status: RESOLVED | Noted: 2020-06-04 | Resolved: 2024-05-07

## 2024-05-07 PROCEDURE — 99204 OFFICE O/P NEW MOD 45 MIN: CPT | Performed by: UROLOGY

## 2024-05-07 RX ORDER — GABAPENTIN 300 MG/1
300 CAPSULE ORAL
COMMUNITY
Start: 2024-03-21 | End: 2025-03-21

## 2024-05-07 RX ORDER — ATORVASTATIN CALCIUM 20 MG/1
20 TABLET, FILM COATED ORAL
COMMUNITY
Start: 2023-11-28

## 2024-05-07 RX ORDER — AMLODIPINE BESYLATE 5 MG/1
5 TABLET ORAL DAILY
COMMUNITY
Start: 2024-02-28 | End: 2025-02-27

## 2024-05-07 RX ORDER — LOSARTAN POTASSIUM 50 MG/1
100 TABLET ORAL DAILY
COMMUNITY
Start: 2023-11-24

## 2024-05-07 NOTE — ASSESSMENT & PLAN NOTE
No bothersome urinary symptoms.  Patient reports undergoing annual prostate cancer screening with primary care physician.  Patient reports normal PSA values

## 2024-05-07 NOTE — ASSESSMENT & PLAN NOTE
Patient with 2 mm nonobstructing right lower pole stone on CT scan in May 2024 at [place]    Discussed with patient that a small nonobstructing stone is unlikely source of his pain    Discussed dietary recommendations to prevent further stone formation including increased fluid intake for goal of 2 and half liters of urine production per day, low-salt diet, normal calcium intake, low oxalate diet    Will get interval renal sonogram in 1 year

## 2024-05-07 NOTE — PROGRESS NOTES
5/7/2024    Arya Gonzalez  1955  [id number]    1. Kidney stone  Assessment & Plan:  Patient with 2 mm nonobstructing right lower pole stone on CT scan in May 2024 at [facility]    Discussed with patient that a small nonobstructing stone is unlikely source of his pain    Discussed dietary recommendations to prevent further stone formation including increased fluid intake for goal of 2 and half liters of urine production per day, low-salt diet, normal calcium intake, low oxalate diet    Will get interval renal sonogram in 1 year    Orders:  -     US kidney and bladder with pvr; Future; Expected date: 05/06/2025    2. Encysted hydrocele    3. Scrotal pain  Assessment & Plan:  Right scrotal/groin pain, associated with lower back pain as well as sciatica    Scrotal sonogram significant for mild to moderate bilateral hydroceles    CT scan unremarkable aside from punctate nonobstructing right lower pole stone    Exam unremarkable for hernia or evidence of epididymitis    Recommend NSAIDs, can consider MRI lumbar spine with primary care physician if pain persists to assess for lumbar disc disease      4. BPH with urinary obstruction  Assessment & Plan:  No bothersome urinary symptoms.  Patient reports undergoing annual prostate cancer screening with primary care physician.  Patient reports normal PSA values           History of Present Illness  68 y.o. male with a history of HTN and Hchol, who recently presented to ED at [facility] with right lower back pain radiating down right leg and into right groin/scrotum  No fevers/chills  No hematuria or dysuria  No nausea/vomiting  Pain worse with sitting or bending    CT scan in ED on 5/5/2024 showed 2 mm right lower pole stone, no hydronephrosis, no ureteral stones    Pain improved since leaving hospital  Pain also was radiating down right leg and into right groin  Does still have some lower back pain    Testicular sonogram also showed small to moderate bilateral  hydroceles, no acute sonographic abnormalities    No prior history of kidney stones  No family history of stones      AUA Symptom Score  AUA SYMPTOM SCORE      Flowsheet Row Most Recent Value   AUA SYMPTOM SCORE    How often have you had a sensation of not emptying your bladder completely after you finished urinating? 0 (P)    How often have you had to urinate again less than two hours after you finished urinating? 2 (P)    How often have you found you stopped and started again several times when you urinate? 0 (P)    How often have you found it difficult to postpone urination? 2 (P)    How often have you had a weak urinary stream? 0 (P)    How often have you had to push or strain to begin urination? 0 (P)    How many times did you most typically get up to urinate from the time you went to bed at night until the time you got up in the morning? 1 (P)    Quality of Life: If you were to spend the rest of your life with your urinary condition just the way it is now, how would you feel about that? 2 (P)    AUA SYMPTOM SCORE 5 (P)             Review of Systems   All other systems reviewed and are negative.      Past Medical History  Past Medical History:   Diagnosis Date    Arthritis     Arthritis     Benign prostatic hyperplasia with lower urinary tract symptoms     Epididymitis     Hearing loss     Hyperlipidemia     Hyperlipidemia     Hypertension     Macular degeneration     Obese 8/4/2020    Sleep apnea     doesn't use the CPAP    Spermatocele     Wears glasses        Past Social History  Past Surgical History:   Procedure Laterality Date    COLONOSCOPY      HERNIA REPAIR  1997    r inguinal    KNEE ARTHROSCOPY Bilateral     knee    KNEE SURGERY Right 04/2016    For removal of bursa    MO EXCISION SPERMATOCELE W/WO EPIDIDYMECTOMY Right 8/4/2020    Procedure: SPERMATOCELECTOMY;  Surgeon: Real Dimas MD;  Location: [facility] OR;  Service: Urology    WISDOM TOOTH EXTRACTION         Past Family History  Family History    Problem Relation Age of Onset    Cancer Mother     Cancer Father        Past Social history  Social History     Socioeconomic History    Marital status: /Civil Union     Spouse name: Not on file    Number of children: Not on file    Years of education: Not on file    Highest education level: Not on file   Occupational History    Occupation: .   Tobacco Use    Smoking status: Never    Smokeless tobacco: Never   Vaping Use    Vaping status: Never Used   Substance and Sexual Activity    Alcohol use: Yes    Drug use: No    Sexual activity: Not on file   Other Topics Concern    Not on file   Social History Narrative    Not on file     Social Determinants of Health     Financial Resource Strain: Patient Declined (8/31/2023)    Received from [facility] [address] [facility]    Overall Financial Resource Strain (CARDIA)     Difficulty of Paying Living Expenses: Patient declined   Food Insecurity: Patient Declined (8/31/2023)    Received from [facility] [address] [facility]    Hunger Vital Sign     Worried About Running Out of Food in the Last Year: Patient declined     Ran Out of Food in the Last Year: Patient declined   Transportation Needs: No Transportation Needs (8/31/2023)    Received from [facility] [address]    PRAPARE - Transportation     Lack of Transportation (Medical): No     Lack of Transportation (Non-Medical): No   Physical Activity: Not on file   Stress: No Stress Concern Present (8/31/2023)    Received from [facility] [address]    [employer] - Occupational Stress Questionnaire     Feeling of Stress : Not at all   Social Connections: Socially Integrated (8/31/2023)    Received from [facility] [address]    Social Connection and Isolation Panel [NHANES]     Frequency of Communication with Friends and Family: More than three times a week     Frequency of Social Gatherings with Friends and Family: More than three times a week     Attends  "[employer] Services: 1 to 4 times per year     Active Member of Clubs or Organizations: Yes     Attends Club or Organization Meetings: More than 4 times per year     Marital Status:    Intimate Partner Violence: Not At Risk (8/31/2023)    Received from [facility]    Humiliation, Afraid, Rape, and Kick questionnaire     Fear of Current or Ex-Partner: No     Emotionally Abused: No     Physically Abused: No     Sexually Abused: No   Housing Stability: Unknown (8/31/2023)    Received from [facility]    Housing Stability Vital Sign     Unable to Pay for Housing in the Last Year: Patient refused     Number of Places Lived in the Last Year: 1     Unstable Housing in the Last Year: No       Current Medications  Current Outpatient Medications   Medication Sig Dispense Refill    amLODIPine (NORVASC) 5 mg tablet Take 5 mg by mouth daily      atorvastatin (LIPITOR) 20 mg tablet Take 20 mg by mouth      gabapentin (NEURONTIN) 300 mg capsule Take 300 mg by mouth      losartan (COZAAR) 50 mg tablet Take 100 mg by mouth daily      Multiple Vitamins-Minerals (PRESERVISION AREDS 2 PO) Take 1 tablet by mouth daily      Omega-3 1000 MG CAPS Take 1 capsule by mouth daily       lisinopril (ZESTRIL) 20 mg tablet Take 20 mg by mouth daily  (Patient not taking: Reported on 5/7/2024)       No current facility-administered medications for this visit.       Allergies  No Known Allergies    Past Medical History, Social History, Family History, medications and allergies were reviewed.    Vitals  Vitals:    05/07/24 1047   BP: 158/80   BP Location: Left arm   Patient Position: Sitting   Cuff Size: Large   Pulse: 66   SpO2: 96%   Weight: 111 kg (244 lb 9.6 oz)   Height: 5' 8\" (1.727 m)       Physical Exam  Constitutional:       Appearance: Normal appearance. He is normal weight.   HENT:      Head: Normocephalic.      Nose: Nose normal. No congestion.   Eyes:      Conjunctiva/sclera: Conjunctivae normal. " "  Cardiovascular:      Rate and Rhythm: Normal rate.   Pulmonary:      Effort: Pulmonary effort is normal. No respiratory distress.   Abdominal:      General: Abdomen is flat. There is no distension.      Palpations: Abdomen is soft.   Genitourinary:     Penis: Normal.       Testes: Normal.      Comments: Mild bilateral hydroceles  No tenderness on exam, no induration or erythema  Musculoskeletal:      Comments: Normal gait   Skin:     General: Skin is warm and dry.   Neurological:      General: No focal deficit present.      Mental Status: He is alert and oriented to person, place, and time.   Psychiatric:         Mood and Affect: Mood normal.         Results  No results found for: \"PSA\"  Lab Results   Component Value Date    CALCIUM 10.1 05/05/2024    K 4.3 05/05/2024    CO2 27 05/05/2024     05/05/2024    BUN 14 05/05/2024    CREATININE 1.12 05/05/2024     Lab Results   Component Value Date    WBC 6.77 07/25/2020    HGB 14.5 07/25/2020    HCT 44.6 07/25/2020    MCV 95 07/25/2020     07/25/2020       Office Urine Dip  No results found for this or any previous visit (from the past 1 hour(s)).]    "

## 2024-05-07 NOTE — ASSESSMENT & PLAN NOTE
Right scrotal/groin pain, associated with lower back pain as well as sciatica    Scrotal sonogram significant for mild to moderate bilateral hydroceles    CT scan unremarkable aside from punctate nonobstructing right lower pole stone    Exam unremarkable for hernia or evidence of epididymitis    Recommend NSAIDs, can consider MRI lumbar spine with primary care physician if pain persists to assess for lumbar disc disease

## 2025-04-04 DIAGNOSIS — Z00.6 ENCOUNTER FOR EXAMINATION FOR NORMAL COMPARISON OR CONTROL IN CLINICAL RESEARCH PROGRAM: ICD-10-CM

## 2025-04-07 ENCOUNTER — HOSPITAL ENCOUNTER (OUTPATIENT)
Dept: ULTRASOUND IMAGING | Facility: MEDICAL CENTER | Age: 70
Discharge: HOME | End: 2025-04-07
Payer: MEDICARE

## 2025-04-07 DIAGNOSIS — N20.0 KIDNEY STONE: ICD-10-CM

## 2025-04-07 PROCEDURE — 76770 US EXAM ABDO BACK WALL COMP: CPT

## 2025-04-08 ENCOUNTER — RESULTS FOLLOW-UP (OUTPATIENT)
Dept: UROLOGY | Facility: MEDICAL CENTER | Age: 70
End: 2025-04-08

## 2025-04-09 ENCOUNTER — APPOINTMENT (OUTPATIENT)
Age: 70
End: 2025-04-09

## 2025-04-09 DIAGNOSIS — Z00.6 ENCOUNTER FOR EXAMINATION FOR NORMAL COMPARISON OR CONTROL IN CLINICAL RESEARCH PROGRAM: ICD-10-CM

## 2025-04-09 PROCEDURE — 36415 COLL VENOUS BLD VENIPUNCTURE: CPT

## 2025-04-18 LAB
APOB+LDLR+PCSK9 GENE MUT ANL BLD/T: NOT DETECTED
BRCA1+BRCA2 DEL+DUP + FULL MUT ANL BLD/T: NOT DETECTED
MLH1+MSH2+MSH6+PMS2 GN DEL+DUP+FUL M: NOT DETECTED

## 2025-05-09 NOTE — PROGRESS NOTES
Name: Arya Gonzalez      : 1955      MRN: [id number]  Encounter Provider: MYRON Dominguez  Encounter Date: 2025   Encounter department: [facility] [place]  :  Assessment & Plan  BPH with urinary obstruction  PVR: 131 mL  Patient reports nocturia 2-3 times a night.  And urinary urgency  Discussed with patient to trial tamsulosin for 3 months and follow-up.  Discussed side effects of medication with patient.  Patient denies dysuria, hematuria  AUA: 15  Orders:    POCT Measure PVR    tamsulosin (FLOMAX) 0.4 mg; Take 1 capsule (0.4 mg total) by mouth daily with dinner    Kidney stone  Reviewed ultrasound of kidney bladder which showed a 1.6 cm cyst in lower pole left kidney.  And 2 mm lower stone.  No hydronephrosis.  Will continue to follow-up in 1 year       Screening for prostate cancer  MARIANNA: Benign  PSA: 0.41 (10/3/2024), will repeat in October  Discussed with patient to avoid intercourse/motorcycle/bicycle riding for some 2 hours prior to getting PSA done  Patient has family history of prostate cancer: Father  Orders:    PSA, Total Screen; Future        History of Present Illness   Arya Gonzalez is a 69 y.o. male who presents for 1 year follow-up for kidney stones.  Ultrasound was completed on 2025 which showed a 1.6 cm cyst in lower pole left kidney.  And postvoid residual of 157 mL    Patient reports urinary urgency and frequency.  Nocturia 2-3 times a night.  Patient denies hematuria, dysuria.  Patient also reports family history of prostate cancer :father.      AUA SYMPTOM SCORE      Flowsheet Row Most Recent Value   AUA SYMPTOM SCORE    How often have you had a sensation of not emptying your bladder completely after you finished urinating? 1 (P)     How often have you had to urinate again less than two hours after you finished urinating? 2 (P)     How often have you found you stopped and started again several times when you urinate? 1 (P)     How often  "have you found it difficult to postpone urination? 2 (P)     How often have you had a weak urinary stream? 3 (P)     How often have you had to push or strain to begin urination? 2 (P)     How many times did you most typically get up to urinate from the time you went to bed at night until the time you got up in the morning? 4 (P)     Quality of Life: If you were to spend the rest of your life with your urinary condition just the way it is now, how would you feel about that? 3 (P)     AUA SYMPTOM SCORE 15 (P)            Review of Systems   Constitutional:  Negative for chills and fever.   HENT:  Negative for ear pain and sore throat.    Eyes:  Negative for pain and visual disturbance.   Respiratory:  Negative for cough and shortness of breath.    Cardiovascular:  Negative for chest pain and palpitations.   Gastrointestinal:  Negative for abdominal pain and vomiting.   Genitourinary:  Positive for frequency and urgency. Negative for dysuria and hematuria.   Musculoskeletal:  Negative for arthralgias and back pain.   Skin:  Negative for color change and rash.   Neurological:  Negative for seizures and syncope.          Objective   /60 (BP Location: Left arm, Patient Position: Sitting, Cuff Size: Standard)   Pulse 75   Ht 5' 7\" (1.702 m)   Wt 108 kg (239 lb)   SpO2 98%   BMI 37.43 kg/m²     Physical Exam  Vitals reviewed.   Constitutional:       Appearance: Normal appearance.   Cardiovascular:      Rate and Rhythm: Normal rate.   Genitourinary:     Comments: Digital rectal exam smooth prostate, without appreciable nodule, induration or asymmetry      Skin:     General: Skin is warm.   Neurological:      General: No focal deficit present.      Mental Status: He is oriented to person, place, and time.   Psychiatric:         Mood and Affect: Mood normal.         Behavior: Behavior normal.           Results   No results found for: \"PSA\"  Lab Results   Component Value Date    CALCIUM 9.7 04/04/2025    K 4.3 " 04/04/2025    CO2 30 04/04/2025    CL 99 (L) 04/04/2025    BUN 19 04/04/2025    CREATININE 1.01 04/04/2025     Lab Results   Component Value Date    WBC 6.77 07/25/2020    HGB 14.5 07/25/2020    HCT 44.6 07/25/2020    MCV 95 07/25/2020     07/25/2020       Office Urine Dip  Recent Results (from the past hour)   POCT Measure PVR    Collection Time: 05/12/25  9:20 AM   Result Value Ref Range    POST-VOID RESIDUAL VOLUME, ML  mL

## 2025-05-12 ENCOUNTER — OFFICE VISIT (OUTPATIENT)
Dept: UROLOGY | Facility: MEDICAL CENTER | Age: 70
End: 2025-05-12
Payer: MEDICARE

## 2025-05-12 VITALS
OXYGEN SATURATION: 98 % | SYSTOLIC BLOOD PRESSURE: 110 MMHG | WEIGHT: 239 LBS | DIASTOLIC BLOOD PRESSURE: 60 MMHG | BODY MASS INDEX: 37.51 KG/M2 | HEIGHT: 67 IN | HEART RATE: 75 BPM

## 2025-05-12 DIAGNOSIS — N20.0 KIDNEY STONE: ICD-10-CM

## 2025-05-12 DIAGNOSIS — N13.8 BPH WITH URINARY OBSTRUCTION: Primary | ICD-10-CM

## 2025-05-12 DIAGNOSIS — N40.1 BPH WITH URINARY OBSTRUCTION: Primary | ICD-10-CM

## 2025-05-12 DIAGNOSIS — Z12.5 SCREENING FOR PROSTATE CANCER: ICD-10-CM

## 2025-05-12 PROBLEM — G95.9 CERVICAL MYELOPATHY (HCC): Status: ACTIVE | Noted: 2025-05-12

## 2025-05-12 LAB — POST-VOID RESIDUAL VOLUME, ML POC: 131 ML

## 2025-05-12 PROCEDURE — 99214 OFFICE O/P EST MOD 30 MIN: CPT

## 2025-05-12 PROCEDURE — 51798 US URINE CAPACITY MEASURE: CPT

## 2025-05-12 RX ORDER — GABAPENTIN 100 MG/1
100 CAPSULE ORAL 3 TIMES DAILY
COMMUNITY

## 2025-05-12 RX ORDER — TAMSULOSIN HYDROCHLORIDE 0.4 MG/1
0.4 CAPSULE ORAL
Qty: 90 CAPSULE | Refills: 0 | Status: SHIPPED | OUTPATIENT
Start: 2025-05-12

## 2025-05-12 RX ORDER — HYDROCHLOROTHIAZIDE 12.5 MG/1
12.5 TABLET ORAL DAILY
COMMUNITY
Start: 2025-03-13

## 2025-05-12 NOTE — ASSESSMENT & PLAN NOTE
Reviewed ultrasound of kidney bladder which showed a 1.6 cm cyst in lower pole left kidney.  And 2 mm lower stone.  No hydronephrosis.  Will continue to follow-up in 1 year

## 2025-05-12 NOTE — ASSESSMENT & PLAN NOTE
PVR: 131 mL  Patient reports nocturia 2-3 times a night.  And urinary urgency  Discussed with patient to trial tamsulosin for 3 months and follow-up.  Discussed side effects of medication with patient.  Patient denies dysuria, hematuria  AUA: 15  Orders:    POCT Measure PVR    tamsulosin (FLOMAX) 0.4 mg; Take 1 capsule (0.4 mg total) by mouth daily with dinner

## 2025-08-12 ENCOUNTER — OFFICE VISIT (OUTPATIENT)
Dept: UROLOGY | Facility: MEDICAL CENTER | Age: 70
End: 2025-08-12
Payer: MEDICARE

## (undated) DEVICE — GLOVE PI ULTRA TOUCH SZ.6.5

## (undated) DEVICE — SUPER SPONGES,MEDIUM: Brand: KERLIX

## (undated) DEVICE — STRL PENROSE DRAIN 18" X 1/4": Brand: CARDINAL HEALTH

## (undated) DEVICE — MEDI-VAC YANKAUER SUCTION HANDLE W/BULBOUS AND CONTROL VENT: Brand: CARDINAL HEALTH

## (undated) DEVICE — GLOVE SRG BIOGEL 8

## (undated) DEVICE — SUT CHROMIC 3-0 SH 27 IN G122H

## (undated) DEVICE — PLUMEPEN PRO 10FT

## (undated) DEVICE — TUBING SUCTION 5MM X 12 FT

## (undated) DEVICE — ADHESIVE SKIN HIGH VISCOSITY EXOFIN 1ML

## (undated) DEVICE — GLOVE INDICATOR PI UNDERGLOVE SZ 8 BLUE

## (undated) DEVICE — EXIDINE 4 PCT

## (undated) DEVICE — PREMIUM DRY TRAY LF: Brand: MEDLINE INDUSTRIES, INC.

## (undated) DEVICE — SCD SEQUENTIAL COMPRESSION COMFORT SLEEVE MEDIUM KNEE LENGTH: Brand: KENDALL SCD

## (undated) DEVICE — NEEDLE 25G X 1 1/2

## (undated) DEVICE — BETHLEHEM UNIVERSAL MINOR GEN: Brand: CARDINAL HEALTH

## (undated) DEVICE — GLOVE SRG BIOGEL 7.5

## (undated) DEVICE — SUT VICRYL 4-0 PS-2 27 IN J426H

## (undated) DEVICE — INTENDED FOR TISSUE SEPARATION, AND OTHER PROCEDURES THAT REQUIRE A SHARP SURGICAL BLADE TO PUNCTURE OR CUT.: Brand: BARD-PARKER SAFETY BLADES SIZE 15, STERILE

## (undated) DEVICE — ASTOUND STANDARD SURGICAL GOWN, XL: Brand: CONVERTORS